# Patient Record
Sex: MALE | Race: WHITE | NOT HISPANIC OR LATINO | URBAN - METROPOLITAN AREA
[De-identification: names, ages, dates, MRNs, and addresses within clinical notes are randomized per-mention and may not be internally consistent; named-entity substitution may affect disease eponyms.]

---

## 2020-01-30 ENCOUNTER — EMERGENCY (EMERGENCY)
Facility: HOSPITAL | Age: 35
LOS: 1 days | Discharge: HOME | End: 2020-01-30
Attending: STUDENT IN AN ORGANIZED HEALTH CARE EDUCATION/TRAINING PROGRAM | Admitting: STUDENT IN AN ORGANIZED HEALTH CARE EDUCATION/TRAINING PROGRAM
Payer: OTHER MISCELLANEOUS

## 2020-01-30 VITALS
OXYGEN SATURATION: 100 % | DIASTOLIC BLOOD PRESSURE: 73 MMHG | SYSTOLIC BLOOD PRESSURE: 118 MMHG | TEMPERATURE: 98 F | RESPIRATION RATE: 18 BRPM | HEART RATE: 68 BPM

## 2020-01-30 PROCEDURE — 99283 EMERGENCY DEPT VISIT LOW MDM: CPT

## 2020-01-30 RX ORDER — KETOTIFEN FUMARATE 0.34 MG/ML
1 SOLUTION OPHTHALMIC
Qty: 1 | Refills: 0
Start: 2020-01-30 | End: 2020-02-01

## 2020-01-30 NOTE — ED PROVIDER NOTE - NSFOLLOWUPINSTRUCTIONS_ED_ALL_ED_FT
You were seen in the Emergency Department (ED) for eye irritation.     You are prescribed Ketotifin eye drops. Please take as directed by your pharmacists.   Use artificial tears as directed by the packaging if you have eye irritation.   Please follow up with your primary care doctor in the next 72 hours.    Please return to the ED if you experience any new or concerning symptoms, such as: worsening eye pain, changes in your vision, eye or eyelid swelling, thick discharge from your affected eye.     Thank you for visiting a [facility] ED.

## 2020-01-30 NOTE — ED PROVIDER NOTE - NS ED ROS FT
GENERAL: No fever, chills  EYES: + foreign body sensation, no vision changes, no discharge.   HEENT: no difficulty speaking   CARDIAC: no chest pain/pressure  PULMONARY: no cough  GI: no abdominal pain  : no dysuria  SKIN: no rashes  NEURO: no headache, lightheadedness  MSK: No joint pain

## 2020-01-30 NOTE — ED ADULT TRIAGE NOTE - CHIEF COMPLAINT QUOTE
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

## 2020-01-30 NOTE — ED PROVIDER NOTE - PATIENT PORTAL LINK FT
You can access the FollowMyHealth Patient Portal offered by [facility] by registering at the following website: [email or web address]. By joining [facility]’s FollowMyHealth portal, you will also be able to view your health information using other applications (apps) compatible with our system.

## 2020-01-30 NOTE — ED PROVIDER NOTE - CLINICAL SUMMARY MEDICAL DECISION MAKING FREE TEXT BOX
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 Most likely corneal abrasion vs retained foreign body. Slit lamp exam and fluorescin exam. Likely DC home with Ophtho follow up. 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 Most likely corneal abrasion, foreign body, conjunctivitis. Slit lamp exam and fluorescin exam. Likely DC home with Ophtho follow up. 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 exam without evidence of foreign body, but conjunctiva injected. has been using abx gtt (had at home). will give antihistamine gtt and artificial tears. patient understands to return to ED if vision changes, eye pain, discharge, swelling or other concerning symptoms.

## 2020-01-30 NOTE — ED PROVIDER NOTE - PHYSICAL EXAMINATION
General: Patient awake alert NAD.   HEENT: normocephalic, atraumatic, PERRL + baseline Right eye strabismus and 20/100 vision. Left (affected) 20/20 vision with mild conjunctival erythema. Left eye no foreign body or corneal abrasion seen on slit lamp exam, no foreign body seen on everted eyelid.   Cardiac: RRR, S1, S2, no murmur.   LUNGS: CTA B/L no wheeze, rhonchi, rales.   Abdomen: soft NT, ND  EXT: Moving all extremities and full ROM in all extremities.     Neuro: A&Ox3, gait normal, no focal neurological deficits  Skin: warm, dry, no rash. General: Patient awake alert NAD. speaking full sentense   HEENT: normocephalic, atraumatic, PERRL + baseline Right eye strabismus and 20/100 vision. EOMI Left (affected) 20/20 vision with mild left conjunctival erythema. Left eye no foreign body or corneal abrasion seen on slit lamp exam, no foreign body seen on everted eyelid. neg fluorescein uptake. nevus at 10 o'clock position of iris.   Cardiac: RRR, S1, S2, no murmur.   LUNGS: CTA B/L no wheeze, rhonchi, rales.   Abdomen: soft NT, ND  EXT: Moving all extremities and full ROM in all extremities.     Neuro: A&Ox3, gait normal, no focal neurological deficits  Skin: warm, dry, no rash.

## 2020-01-30 NOTE — ED PROVIDER NOTE - OBJECTIVE STATEMENT
35 yo M with known turmor compressing on right optic nerve, pw left eye foreign body sensation after something fell into eye yesterday afternoon. 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 Pt irrigated eye with water last night. This AM, still feels foreign body sensation and mild eye redness and eye lid swelling so supervisor advised him to come to ED. Pt denies vision change. 33 yo M with known turmor compressing on right optic nerve, pw left eye foreign body sensation after something fell into eye yesterday afternoon. 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 Pt irrigated eye with water last night. This AM, still feels foreign body sensation and mild eye redness and eye lid swelling so supervisor advised him to come to ED. Pt denies vision change, tearing or pain

## 2020-04-25 ENCOUNTER — MESSAGE (OUTPATIENT)
Age: 35
End: 2020-04-25

## 2020-05-08 LAB
SARS-COV-2 IGG SERPL IA-ACNC: <0.1 INDEX
SARS-COV-2 IGG SERPL QL IA: NEGATIVE

## 2022-11-30 ENCOUNTER — NON-APPOINTMENT (OUTPATIENT)
Age: 37
End: 2022-11-30